# Patient Record
Sex: FEMALE | Race: WHITE
[De-identification: names, ages, dates, MRNs, and addresses within clinical notes are randomized per-mention and may not be internally consistent; named-entity substitution may affect disease eponyms.]

---

## 2018-10-27 ENCOUNTER — HOSPITAL ENCOUNTER (EMERGENCY)
Dept: HOSPITAL 80 - FED | Age: 43
Discharge: HOME | End: 2018-10-27
Payer: COMMERCIAL

## 2018-10-27 VITALS — DIASTOLIC BLOOD PRESSURE: 73 MMHG | SYSTOLIC BLOOD PRESSURE: 114 MMHG

## 2018-10-27 DIAGNOSIS — R20.0: ICD-10-CM

## 2018-10-27 DIAGNOSIS — R07.89: Primary | ICD-10-CM

## 2018-10-27 LAB — PLATELET # BLD: 415 10^3/UL (ref 150–400)

## 2018-10-27 NOTE — CPEKG
Test Reason : OPEN

Blood Pressure : ***/*** mmHG

Vent. Rate : 076 BPM     Atrial Rate : 075 BPM

   P-R Int : 137 ms          QRS Dur : 094 ms

    QT Int : 375 ms       P-R-T Axes : 045 056 030 degrees

   QTc Int : 422 ms

 

Sinus rhythm

 

Confirmed by Juliano Bergman ([id number]) on 10/27/2018 3:19:54 PM

 

Referred By:             Confirmed By:Juliano Bergman

## 2018-10-27 NOTE — EDPHY
HPI/HX/ROS/PE/MDM


Narrative: 





CHIEF COMPLAINT: Chest pain





HPI: 





This patient is a healthy 42 year old female. She complains of chest tightness 

and discomfort beginning this morning around 9:00 while she was on her 

computer. She has associated numbness radiating to left arm to her hand. The 

discomfort is intermittent and fluctuates from 0/10 to 10/10 severity. Initially

, she felt a "pulsing" sensation with discomfort around 6-7/10 in severity. 

This increased when she stood. She denies history of heart disease in her 

family. Father has history of CVA. No personal history of clots or clotting 

disorders. No recent illness. No unusual activities or foods. Denies history of 

hyperlipidemia, hypertension, diabetes. Currently, she feels anxious and 

lethargic. She denies any current chest discomfort or shortness of breath. She 

continues to note decreased sensation and strength in her left hand. No fever, 

nausea, vomiting, or other associated symptoms. 


 


REVIEW OF SYSTEMS:


A comprehensive 10 system review of systems is otherwise negative aside from 

elements mentioned in the history of present illness and medical decision 

making.





PMH: Uterine ablation





SOCIAL HISTORY: Employed. .  at bedside. 





PHYSICAL EXAM:


General:Patient is alert, in no acute distress.


ENT:Eyes are normal to inspection.  ENT inspection normal.


Neck: Normal inspection.  Full range of motion.


Respiratory:No respiratory distress.  Breath sounds normal bilaterally.


Cardiovascular: Regular rate and rhythm.  Strong peripheral pulses.  Normal cap 

refill.


Abdomen:The abdomen is nontender to palpation. There are no peritoneal signs. 

There are normal bowel sounds.


Back: Normal to inspection.  No tenderness to palpation.


Skin: Normal color.  No rash.  Warm and dry.


Extremities: Normal appearance.  Full range of motion.


Neuro: Oriented x3.  Normal motor function.  Normal sensory function.





ED Course: 





This 42 year old female presents with chest pain ongoing intermittently since 9:

00 this morning. Exam unremarkable. She is currently asymptomatic. Plan for EKG

, chest x-ray, labs including CBC, chemistries, troponin, d-dimer, BHCG.  Plan 

to administer 0.5mg IV Ativan for anxiety relief at the patient's request. 





EKG was ordered and interpreted by myself.  Please see [facility] system for 

official reading. Sinus rhythm. 





Reviewed laboratory results. Troponin and D-dimer negative. Labs otherwise 

largely unremarkable. 





Chest x-ray negative for acute processes. 





11:30 Reassessed. The patient remains asymptomatic. Discussed imaging and 

laboratory studies. I offered her admission for observation, but patient 

declines and prefers discharge home with outpatient followup. Plan to discharge 

home in good condition. Strict return precautions discussed. Referral to 

cardiology provided. She is comfortable with this plan. 


MDM: 





On re-evaluation at 11:30 a.m., the patient remains completely chest pain-free 

and asymptomatic.  We had an extensive discussion regarding her negative workup 

I offered her admission to the hospital for observation and further lab work, 

but she declines.  She would like to go home.  We discussed strict return 

precautions.  I will place an order for referral to Cardiology service for 

urgent cardiology evaluation in order to exclude any small possibility that 

this represents heart disease.  The patient is comfortable with this plan.





I see no indication of acute coronary syndrome, pneumothorax, thoracic aortic 

dissection, pulmonary embolism, pneumonia.





Her HEART score calculation is 1, making her low risk.





- Data Points


Imaging Results: 


 Imaging Impressions





Chest X-Ray  10/27/18 10:13


Impression: Negative chest.











Imaging: I viewed and interpreted images myself


Laboratory Results: 


 Laboratory Results





 10/27/18 10:15 





 10/27/18 10:15 





 











  10/27/18 10/27/18 10/27/18





  10:20 10:15 10:15


 


WBC      





    


 


RBC      





    


 


Hgb      





    


 


Hct      





    


 


MCV      





    


 


MCH      





    


 


MCHC      





    


 


RDW      





    


 


Plt Count      





    


 


MPV      





    


 


Neut % (Auto)      





    


 


Lymph % (Auto)      





    


 


Mono % (Auto)      





    


 


Eos % (Auto)      





    


 


Baso % (Auto)      





    


 


Nucleat RBC Rel Count      





    


 


Absolute Neuts (auto)      





    


 


Absolute Lymphs (auto)      





    


 


Absolute Monos (auto)      





    


 


Absolute Eos (auto)      





    


 


Absolute Basos (auto)      





    


 


Absolute Nucleated RBC      





    


 


Immature Gran %      





    


 


Immature Gran #      





    


 


D-Dimer      





    


 


Sodium      139 mEq/L mEq/L





     (135-145) 


 


Potassium      4.5 mEq/L mEq/L





     (3.3-5.0) 


 


Chloride      107 mEq/L mEq/L





     () 


 


Carbon Dioxide      21 mEq/l L mEq/l





     (22-31) 


 


Anion Gap      11 mEq/L mEq/L





     (6-14) 


 


BUN      12 mg/dL mg/dL





     (7-23) 


 


Creatinine      0.6 mg/dL mg/dL





     (0.6-1.0) 


 


Estimated GFR      > 60 





    


 


Glucose      86 mg/dL mg/dL





     () 


 


Calcium      9.7 mg/dL mg/dL





     (8.5-10.4) 


 


POC Troponin I  0.00 ng/mL ng/mL    





   (0.00-0.08)   


 


Beta HCG, Qual    NEGATIVE   





    














  10/27/18 10/27/18





  10:15 10:15


 


WBC    7.75 10^3/uL 10^3/uL





    (3.80-9.50) 


 


RBC    5.04 10^6/uL 10^6/uL





    (4.18-5.33) 


 


Hgb    14.8 g/dL g/dL





    (12.6-16.3) 


 


Hct    44.0 % %





    (38.0-47.0) 


 


MCV    87.3 fL fL





    (81.5-99.8) 


 


MCH    29.4 pg pg





    (27.9-34.1) 


 


MCHC    33.6 g/dL g/dL





    (32.4-36.7) 


 


RDW    13.0 % %





    (11.5-15.2) 


 


Plt Count    415 10^3/uL H 10^3/uL





    (150-400) 


 


MPV    9.4 fL fL





    (8.7-11.7) 


 


Neut % (Auto)    67.4 % %





    (39.3-74.2) 


 


Lymph % (Auto)    24.4 % %





    (15.0-45.0) 


 


Mono % (Auto)    5.9 % %





    (4.5-13.0) 


 


Eos % (Auto)    1.5 % %





    (0.6-7.6) 


 


Baso % (Auto)    0.5 % %





    (0.3-1.7) 


 


Nucleat RBC Rel Count    0.0 % %





    (0.0-0.2) 


 


Absolute Neuts (auto)    5.22 10^3/uL 10^3/uL





    (1.70-6.50) 


 


Absolute Lymphs (auto)    1.89 10^3/uL 10^3/uL





    (1.00-3.00) 


 


Absolute Monos (auto)    0.46 10^3/uL 10^3/uL





    (0.30-0.80) 


 


Absolute Eos (auto)    0.12 10^3/uL 10^3/uL





    (0.03-0.40) 


 


Absolute Basos (auto)    0.04 10^3/uL 10^3/uL





    (0.02-0.10) 


 


Absolute Nucleated RBC    0.00 10^3/uL 10^3/uL





    (0-0.01) 


 


Immature Gran %    0.3 % %





    (0.0-1.1) 


 


Immature Gran #    0.02 10^3/uL 10^3/uL





    (0.00-0.10) 


 


D-Dimer  < 0.27 ug/mLFEU ug/mLFEU  





   (0.00-0.50)  


 


Sodium    





   


 


Potassium    





   


 


Chloride    





   


 


Carbon Dioxide    





   


 


Anion Gap    





   


 


BUN    





   


 


Creatinine    





   


 


Estimated GFR    





   


 


Glucose    





   


 


Calcium    





   


 


POC Troponin I    





   


 


Beta HCG, Qual    





   











Medications Given: 


 








Discontinued Medications





Lorazepam (Ativan Injection)  0.5 mg IVP EDNOW ONE


   Stop: 10/27/18 10:16


   Last Admin: 10/27/18 10:30 Dose:  0.5 mg





Point of Care Test Results: 


 Chemistry











  10/27/18





  10:20


 


POC Troponin I  0.00 ng/mL ng/mL





   (0.00-0.08) 














General


Time Seen by Provider: 10/27/18 10:02


Initial Vital Signs: 


 Initial Vital Signs











Temperature (C)  36.8 C   10/27/18 09:59


 


Heart Rate  80   10/27/18 09:59


 


Respiratory Rate  18   10/27/18 09:59


 


Blood Pressure  131/95 H  10/27/18 09:59


 


O2 Sat (%)  98   10/27/18 09:59








 











O2 Delivery Mode               Room Air














Allergies/Adverse Reactions: 


 





ampicillin Allergy (Verified 10/27/18 09:59)


 Swelling/neck,face,throat


Penicillins Allergy (Verified 10/27/18 09:59)


 Swelling/neck,face,throat


Sulfa (Sulfonamide Antibiotics) Allergy (Verified 10/27/18 09:59)


 Swelling/neck,face,throat








Home Medications: 














 Medication  Instructions  Recorded


 


NK [No Known Home Meds]  10/27/18














Departure





- Departure


Disposition: Home, Routine, Self-Care


Clinical Impression: 


 Chest pain





Condition: Good


Instructions:  Chest Pain (ED)


Additional Instructions: 


Follow-up with your primary doctor within 72 hours.  Return to the Emergency 

Department for fever, chest pain, shortness of breath, increasing pain or other 

worsening of condition.  Follow up with a cardiologist for further testing, as 

soon as possible, within one week.   We would be happy to reevaluate you and 

observe you in the hospital at any time.


Referrals: 


Josefa John MD [Primary Care Provider] - As per Instructions


Report Scribed for: Juliano Bergman


Report Scribed by: Concepción Wood


Date of Report: 10/27/18


Time of Report: 11:50


Physician Review and Approval Statement: Portions of this note were transcribed 

by an ED scribe.  I personally performed the history, physical exam, and 

medical decision making; and confirm the accuracy of the information in the 

transcribed note.